# Patient Record
Sex: MALE | HISPANIC OR LATINO | URBAN - METROPOLITAN AREA
[De-identification: names, ages, dates, MRNs, and addresses within clinical notes are randomized per-mention and may not be internally consistent; named-entity substitution may affect disease eponyms.]

---

## 2017-10-31 ENCOUNTER — PATIENT OUTREACH (OUTPATIENT)
Dept: HEALTH INFORMATION MANAGEMENT | Facility: OTHER | Age: 72
End: 2017-10-31

## 2017-10-31 DIAGNOSIS — Z91.89 AT RISK FOR KNOWLEDGE DEFICIT: ICD-10-CM

## 2017-10-31 DIAGNOSIS — I50.9 CHRONIC CONGESTIVE HEART FAILURE, UNSPECIFIED CONGESTIVE HEART FAILURE TYPE: ICD-10-CM

## 2017-10-31 NOTE — LETTER
November 7, 2017        Bradley Elliott  [address] [facility] [place] [address]        Dear Bradley:    I enjoyed talking with you earlier today and look forward to working with you to meet your healthcare goals.    If you have any questions or concerns, please don't hesitate to call. My direct line is [phone].     I have enclosed some written materials we will be able to review as we work together.      Sincerely,        Frederick Stringer RN (Miki)    Electronically Signed

## 2017-11-07 ENCOUNTER — PATIENT OUTREACH (OUTPATIENT)
Dept: HEALTH INFORMATION MANAGEMENT | Facility: OTHER | Age: 72
End: 2017-11-07

## 2017-11-07 PROBLEM — Z91.89 AT RISK FOR KNOWLEDGE DEFICIT: Status: ACTIVE | Noted: 2017-11-07

## 2017-11-07 NOTE — PROGRESS NOTES
"Reached Mr. Elliott who states he is trying to sort out his medications since discharged from the hospital and \"hopes he got them right as I have 20 medications on my list\".     Patient states he is trying to get the pills into a planner but is unsure of what he is doing. He confirms [facility] nurse was out to see him on 11/6 and looked over the bottles of meds. I called [facility] and spoke with Corazon who states she will make a home visit this afternoon with special attention to assisting patient with setting up his meds.     Mr. Elliott states he has no meter or strips for checking his blood sugars and he is concerned because he does not find his Metformin bottle. He states he does not have a medication bottle named Glucophage either.     Patient states he is aware he has a cardiology appt.tomorrow with Dr. Lucio and will be seeing her in the morning. I will follow up with him after that appt.  "

## 2017-11-09 ENCOUNTER — PATIENT OUTREACH (OUTPATIENT)
Dept: HEALTH INFORMATION MANAGEMENT | Facility: OTHER | Age: 72
End: 2017-11-09

## 2017-11-09 NOTE — PROGRESS NOTES
Left VM message requesting call back to follow up on home health and his appt. With cardiologist yesterday.

## 2017-11-13 ENCOUNTER — PATIENT OUTREACH (OUTPATIENT)
Dept: HEALTH INFORMATION MANAGEMENT | Facility: OTHER | Age: 72
End: 2017-11-13

## 2017-12-04 ENCOUNTER — PATIENT OUTREACH (OUTPATIENT)
Dept: HEALTH INFORMATION MANAGEMENT | Facility: OTHER | Age: 72
End: 2017-12-04

## 2017-12-05 NOTE — PROGRESS NOTES
Spoke with patient who states he fell earlier today and his pill box spilled. He states he is not able to reorganize his meds. I spoke with [facility] supervisor at [phone]. She states that his home health nurse saw patient today after his reported fall and reorganized his meds at that time.   In view of my conversation with Mr. Elliott she agrees to call him and look into what he has reported to me.     Home health supervisor and I discussed his case and their concerns about his ability to reside on his own due to what she describes as his severe anxiety. He lives with his brother per [facility], however the brother is not particularly supportive per [facility].    I called  Patient back and left him a message that [facility] states they will check back with him following his above report.

## 2017-12-06 ENCOUNTER — PATIENT OUTREACH (OUTPATIENT)
Dept: HEALTH INFORMATION MANAGEMENT | Facility: OTHER | Age: 72
End: 2017-12-06

## 2017-12-06 DIAGNOSIS — Z79.899 MEDICATION MANAGEMENT: ICD-10-CM

## 2017-12-07 ENCOUNTER — PATIENT OUTREACH (OUTPATIENT)
Dept: HEALTH INFORMATION MANAGEMENT | Facility: OTHER | Age: 72
End: 2017-12-07

## 2017-12-07 RX ORDER — ENALAPRIL MALEATE 5 MG/1
5 TABLET ORAL DAILY
COMMUNITY

## 2017-12-07 RX ORDER — ISOSORBIDE MONONITRATE 30 MG/1
30 TABLET, EXTENDED RELEASE ORAL EVERY MORNING
COMMUNITY

## 2017-12-07 RX ORDER — DIGOXIN 125 MCG
125 TABLET ORAL DAILY
COMMUNITY

## 2017-12-07 RX ORDER — FAMOTIDINE 20 MG/1
20 TABLET, FILM COATED ORAL DAILY
COMMUNITY

## 2017-12-07 RX ORDER — ATORVASTATIN CALCIUM 40 MG/1
40 TABLET, FILM COATED ORAL DAILY
COMMUNITY

## 2017-12-07 RX ORDER — POTASSIUM CHLORIDE 20 MEQ/1
20 TABLET, EXTENDED RELEASE ORAL
COMMUNITY

## 2017-12-07 RX ORDER — CARVEDILOL 6.25 MG/1
6.25 TABLET ORAL 2 TIMES DAILY WITH MEALS
COMMUNITY

## 2017-12-07 RX ORDER — FUROSEMIDE 40 MG/1
20 TABLET ORAL EVERY MORNING
COMMUNITY

## 2017-12-07 RX ORDER — BISACODYL 5 MG
5 TABLET, DELAYED RELEASE (ENTERIC COATED) ORAL
COMMUNITY

## 2017-12-07 RX ORDER — GLIPIZIDE 10 MG/1
10 TABLET ORAL DAILY
COMMUNITY

## 2017-12-07 RX ORDER — CLOPIDOGREL BISULFATE 75 MG/1
75 TABLET ORAL DAILY
COMMUNITY

## 2017-12-07 RX ORDER — MAGNESIUM OXIDE 400 MG/1
400 TABLET ORAL DAILY
COMMUNITY

## 2017-12-07 NOTE — PROGRESS NOTES
Received referral from manager of care coordination for medication review. Outbound call to Bradley. Bradley resides in [place] and uses [employer]. Patient has [facility]. Added patient-reported medications into EPIC. See medication review pharmacy follow-up [facility].  Patient reports difficulty with setting up PCP appointment and obtaining glucose testing supplies. Informed [facility] of above. She will send message to patient's HH RN and SW to assist patient. She will also send message to HH RN regarding bubble packing of medications. Per [facility]'s pharmacy in [place] can bubble pack. Will send above information to CC RN.     Elizabeth Cronin, PharmD

## 2017-12-22 ENCOUNTER — PATIENT OUTREACH (OUTPATIENT)
Dept: HEALTH INFORMATION MANAGEMENT | Facility: OTHER | Age: 72
End: 2017-12-22

## 2018-01-02 ENCOUNTER — PATIENT OUTREACH (OUTPATIENT)
Dept: HEALTH INFORMATION MANAGEMENT | Facility: OTHER | Age: 73
End: 2018-01-02

## 2018-01-02 NOTE — PROGRESS NOTES
Called patient and received no answer. Called [facility] who state patient remains on service with them. Will continue to follow peripherally.

## 2018-01-09 ENCOUNTER — PATIENT OUTREACH (OUTPATIENT)
Dept: HEALTH INFORMATION MANAGEMENT | Facility: OTHER | Age: 73
End: 2018-01-09

## 2018-01-09 NOTE — PROGRESS NOTES
Checked Care Heather. Patient Home Health Plan of Care reviewed, plan requesting certification for another 60 days to 2/23. Will follow up in one month.

## 2018-02-05 ENCOUNTER — PATIENT OUTREACH (OUTPATIENT)
Dept: HEALTH INFORMATION MANAGEMENT | Facility: OTHER | Age: 73
End: 2018-02-05

## 2018-02-06 NOTE — PROGRESS NOTES
Spoke with [facility] who state patient remains on service through 2/23/2018.

## 2018-03-08 ENCOUNTER — PATIENT OUTREACH (OUTPATIENT)
Dept: HEALTH INFORMATION MANAGEMENT | Facility: OTHER | Age: 73
End: 2018-03-08

## 2018-03-15 ENCOUNTER — PATIENT OUTREACH (OUTPATIENT)
Dept: HEALTH INFORMATION MANAGEMENT | Facility: OTHER | Age: 73
End: 2018-03-15

## 2018-03-15 NOTE — PROGRESS NOTES
Second call to patient with VM message left requesting call back to discuss Care Coordination program now that [facility] has discharged.

## 2018-04-09 ENCOUNTER — PATIENT OUTREACH (OUTPATIENT)
Dept: HEALTH INFORMATION MANAGEMENT | Facility: OTHER | Age: 73
End: 2018-04-09

## 2018-04-09 NOTE — PROGRESS NOTES
There has been no response by patient to my phone calls re: Care Coordination program.     I will discharge patient from my active list and send patient a letter re: same.

## 2018-07-03 ENCOUNTER — PATIENT OUTREACH (OUTPATIENT)
Dept: HEALTH INFORMATION MANAGEMENT | Facility: OTHER | Age: 73
End: 2018-07-03

## 2018-07-03 DIAGNOSIS — I50.9 CONGESTIVE HEART FAILURE, UNSPECIFIED HF CHRONICITY, UNSPECIFIED HEART FAILURE TYPE (HCC): ICD-10-CM

## 2018-07-03 DIAGNOSIS — I73.9 PVD (PERIPHERAL VASCULAR DISEASE) (HCC): ICD-10-CM

## 2018-07-03 DIAGNOSIS — E11.8 TYPE 2 DIABETES MELLITUS WITH COMPLICATION, UNSPECIFIED WHETHER LONG TERM INSULIN USE: ICD-10-CM

## 2018-07-03 DIAGNOSIS — Z71.89 COMPLEX CARE COORDINATION: ICD-10-CM

## 2018-07-03 DIAGNOSIS — J44.9 CHRONIC OBSTRUCTIVE PULMONARY DISEASE, UNSPECIFIED COPD TYPE (HCC): ICD-10-CM

## 2018-08-09 ENCOUNTER — PATIENT OUTREACH (OUTPATIENT)
Dept: HEALTH INFORMATION MANAGEMENT | Facility: OTHER | Age: 73
End: 2018-08-09

## 2018-08-09 DIAGNOSIS — Z71.89 COMPLEX CARE COORDINATION: ICD-10-CM
